# Patient Record
Sex: FEMALE | Race: BLACK OR AFRICAN AMERICAN | NOT HISPANIC OR LATINO | Employment: FULL TIME | URBAN - METROPOLITAN AREA
[De-identification: names, ages, dates, MRNs, and addresses within clinical notes are randomized per-mention and may not be internally consistent; named-entity substitution may affect disease eponyms.]

---

## 2021-08-13 ENCOUNTER — TELEPHONE (OUTPATIENT)
Dept: SURGERY | Facility: CLINIC | Age: 26
End: 2021-08-13

## 2024-06-26 ENCOUNTER — PATIENT MESSAGE (OUTPATIENT)
Dept: CARDIOLOGY | Facility: CLINIC | Age: 29
End: 2024-06-26
Payer: MEDICAID

## 2024-08-26 ENCOUNTER — OFFICE VISIT (OUTPATIENT)
Dept: SURGERY | Facility: CLINIC | Age: 29
End: 2024-08-26
Payer: COMMERCIAL

## 2024-08-26 VITALS
HEART RATE: 82 BPM | SYSTOLIC BLOOD PRESSURE: 121 MMHG | BODY MASS INDEX: 51.36 KG/M2 | WEIGHT: 293 LBS | DIASTOLIC BLOOD PRESSURE: 77 MMHG

## 2024-08-26 DIAGNOSIS — Z30.46 NEXPLANON REMOVAL: Primary | ICD-10-CM

## 2024-08-26 DIAGNOSIS — S40.852A FOREIGN BODY OF LEFT UPPER ARM: ICD-10-CM

## 2024-08-26 DIAGNOSIS — Z76.89 ENCOUNTER TO ESTABLISH CARE: ICD-10-CM

## 2024-08-26 PROCEDURE — 99499 UNLISTED E&M SERVICE: CPT | Mod: S$GLB,,, | Performed by: SURGERY

## 2024-08-26 PROCEDURE — 99999 PR PBB SHADOW E&M-EST. PATIENT-LVL IV: CPT | Mod: PBBFAC,,, | Performed by: SURGERY

## 2024-08-26 RX ORDER — PHENTERMINE HYDROCHLORIDE 37.5 MG/1
37.5 TABLET ORAL EVERY MORNING
COMMUNITY
Start: 2024-06-24

## 2024-08-26 RX ORDER — CEFAZOLIN SODIUM 2 G/50ML
2 SOLUTION INTRAVENOUS
OUTPATIENT
Start: 2024-08-26

## 2024-08-26 RX ORDER — SODIUM CHLORIDE 9 MG/ML
INJECTION, SOLUTION INTRAVENOUS CONTINUOUS
OUTPATIENT
Start: 2024-08-26

## 2024-08-26 RX ORDER — TOPIRAMATE 25 MG/1
25 TABLET ORAL
COMMUNITY
Start: 2024-06-24 | End: 2024-09-22

## 2024-08-26 NOTE — PROGRESS NOTES
History & Physical    Subjective     History of Present Illness:  Patient is a 29 y.o. female referred for Nexplanon implant removal.  She reports having Nexplanon placed in 2019 and ready for it to be removed.    Chief Complaint   Patient presents with    Consult     Removal of Nexplanon       Review of patient's allergies indicates:  No Known Allergies    Current Outpatient Medications   Medication Sig Dispense Refill    etonogestreL (NEXPLANON) 68 mg Impl subdermal device 68 mg by Subdermal route once. A single NEXPLANON implant is inserted subdermally just under the skin at the inner side of the non-dominant upper arm.      phentermine (ADIPEX-P) 37.5 mg tablet Take 37.5 mg by mouth every morning.      topiramate (TOPAMAX) 25 MG tablet Take 25 mg by mouth.       Current Facility-Administered Medications   Medication Dose Route Frequency Provider Last Rate Last Admin    levonorgestreL (MIRENA) 21 mcg/24 hours (8 yrs) 52 mg IUD 1 Intra Uterine Device  1 Intra Uterine Device Intrauterine  Blanca Douglas MD   1 Intra Uterine Device at 10/02/23 1400       No past medical history on file.  Past Surgical History:   Procedure Laterality Date    INTRAUTERINE DEVICE INSERTION      Mirena     Family History   Problem Relation Name Age of Onset    Breast cancer Paternal Grandmother       Social History     Tobacco Use    Smoking status: Never    Smokeless tobacco: Never   Substance Use Topics    Alcohol use: Yes    Drug use: Never        Review of Systems:  Review of Systems   Constitutional:  Negative for activity change, chills, fatigue, fever and unexpected weight change.   HENT:  Negative for hearing loss, rhinorrhea and trouble swallowing.    Eyes:  Negative for discharge and visual disturbance.   Respiratory:  Negative for cough, chest tightness, shortness of breath, wheezing and stridor.    Cardiovascular:  Negative for chest pain, palpitations and leg swelling.   Gastrointestinal:  Negative for abdominal  distention, abdominal pain, blood in stool, constipation, diarrhea, nausea and vomiting.   Endocrine: Negative for polydipsia and polyuria.   Genitourinary:  Negative for difficulty urinating, dysuria, frequency, hematuria, menstrual problem and urgency.   Musculoskeletal:  Negative for arthralgias, joint swelling and neck pain.   Skin:  Negative for color change, pallor, rash and wound.   Neurological:  Negative for weakness and headaches.   Hematological:  Does not bruise/bleed easily.   Psychiatric/Behavioral:  Negative for confusion and dysphoric mood.           Objective     Vital Signs (Most Recent)  Pulse: 82 (08/26/24 1140)  BP: 121/77 (08/26/24 1140)     (!) 140 kg (308 lb 10.3 oz)     Physical Exam:  Physical Exam  Vitals reviewed.   Constitutional:       Appearance: She is well-developed.   HENT:      Head: Normocephalic and atraumatic.   Cardiovascular:      Rate and Rhythm: Normal rate and regular rhythm.   Pulmonary:      Effort: Pulmonary effort is normal.      Breath sounds: Normal breath sounds.   Abdominal:      General: Bowel sounds are normal. There is no distension.      Palpations: Abdomen is soft.      Tenderness: There is no abdominal tenderness.   Musculoskeletal:      Cervical back: Neck supple.   Skin:     General: Skin is warm and dry.          Neurological:      Mental Status: She is alert and oriented to person, place, and time.              Assessment and Plan     29-year-old female with left arm Nexplanon implant    PLAN:    Removal left arm foreign body under local   Will need C-arm to assist with identification  Risks and benefits discussed with patient including but not limited to: Pain, bleeding, infection, scarring, retained foreign body

## 2024-08-28 ENCOUNTER — TELEPHONE (OUTPATIENT)
Dept: PREADMISSION TESTING | Facility: HOSPITAL | Age: 29
End: 2024-08-28
Payer: COMMERCIAL

## 2024-08-28 DIAGNOSIS — Z01.818 PRE-OP TESTING: Primary | ICD-10-CM

## 2024-08-28 NOTE — TELEPHONE ENCOUNTER
Spoke with COLLIN Munoz  regarding pt health history, specifically the patient's BMI 51.36.     Anesthesia response: Spoke with Dr. Drummond. Ok to proceed since pt is local only     Patient scheduled for surgery on 09/10/24.

## 2024-08-28 NOTE — TELEPHONE ENCOUNTER
Spoke with COLLIN Munoz regarding pt health history, specifically the patient's adipex. Pt stated during PAT call today that she took adipex tablet. Informed pt that adipex needs to be held 2 weeks prior to surgery. Pt v/u.     Anesthesia response: Notify Dr. Marshall.     Patient scheduled for surgery on 09/10/24.

## 2024-08-28 NOTE — TELEPHONE ENCOUNTER
Good morning,    This patient is scheduled for surgery with Dr. Marshall on 09/10. Patient stated during PAT call today that she took her adipex tablet this morning. Patient was educated to hold adipex 2 weeks prior to procedure. I see that the patient is listed as a local only case. Is she okay to proceed? Please advise.    Thanks,    Pre Admit Testing